# Patient Record
Sex: FEMALE | Race: ASIAN | URBAN - METROPOLITAN AREA
[De-identification: names, ages, dates, MRNs, and addresses within clinical notes are randomized per-mention and may not be internally consistent; named-entity substitution may affect disease eponyms.]

---

## 2023-01-03 ENCOUNTER — APPOINTMENT (RX ONLY)
Dept: URBAN - METROPOLITAN AREA CLINIC 55 | Facility: CLINIC | Age: 28
Setting detail: DERMATOLOGY
End: 2023-01-03

## 2023-01-03 DIAGNOSIS — Z41.9 ENCOUNTER FOR PROCEDURE FOR PURPOSES OTHER THAN REMEDYING HEALTH STATE, UNSPECIFIED: ICD-10-CM

## 2023-01-03 PROCEDURE — ? IN-HOUSE DISPENSING PHARMACY

## 2023-01-03 PROCEDURE — ? COSMETIC CONSULTATION: GENERAL

## 2023-01-03 PROCEDURE — ? INVENTORY

## 2023-01-03 NOTE — PROCEDURE: IN-HOUSE DISPENSING PHARMACY
Product 53 Refills: 0
Product 4 Application Directions: Apply nightly or as directed.
Product 7 Unit Type: ml
Product 4 Refills: 2
Product 20 Unit Type: mg
Product 1 Amount/Unit (Numbers Only): 30
Name Of Product 7: Lidocaine 23% / Tetracaine 7% Cream
Product 4 Units Dispensed: 1
Product 5 Application Directions: Apply nightly or as directed. Use SPF daily.
Product 7 Application Directions: Apply only as directed
Name Of Product 6: Rosacea Triple Combination Gel
Product 2 Refills: 11
Name Of Product 2: Dapsone / Spironolactone Gel
Name Of Product 4: Hydroquinone 8% Emulsion
Render Product Pricing In Note: Yes
Name Of Product 3: Acne Triple Combination Gel
Send Charges To Patient Encounter: No
Detail Level: Zone
Name Of Product 5: Hydrating Tretinoin 0.025% Cream
Name Of Product 1: Anti-Aging Brightening Cream

## 2023-01-12 ENCOUNTER — APPOINTMENT (RX ONLY)
Dept: URBAN - METROPOLITAN AREA CLINIC 55 | Facility: CLINIC | Age: 28
Setting detail: DERMATOLOGY
End: 2023-01-12

## 2023-01-12 DIAGNOSIS — Z41.9 ENCOUNTER FOR PROCEDURE FOR PURPOSES OTHER THAN REMEDYING HEALTH STATE, UNSPECIFIED: ICD-10-CM

## 2023-01-12 PROCEDURE — ? PALOMAR VECTUS LASER HAIR REMOVAL

## 2023-01-12 PROCEDURE — ? INVENTORY

## 2023-01-12 NOTE — PROCEDURE: PALOMAR VECTUS LASER HAIR REMOVAL
Laser Type: diode 810nm
Post-Procedure Care: Immediate endpoint: perifollicular erythema and edema. Post care was reviewed with the patient and all patient questions were answered to their satisfaction.
Consent obtained, risks reviewed.
Rate (Hz): Medium
Fluence In J/Cm2: 12/17
Render Post-Care In The Note: No
Treatment Number: 0
Optic Size: 12 x 12mm
Pre-Procedure: Prior to proceeding the treatment areas were cleaned and all present put on their eye protection.
Detail Level: Detailed
Post-Care Instructions: I reviewed with the patient in detail post-care instructions. Patient should avoid the sun before and after treatment.

## 2023-02-23 ENCOUNTER — APPOINTMENT (RX ONLY)
Dept: URBAN - METROPOLITAN AREA CLINIC 55 | Facility: CLINIC | Age: 28
Setting detail: DERMATOLOGY
End: 2023-02-23

## 2023-02-23 DIAGNOSIS — Z41.9 ENCOUNTER FOR PROCEDURE FOR PURPOSES OTHER THAN REMEDYING HEALTH STATE, UNSPECIFIED: ICD-10-CM

## 2023-02-23 PROCEDURE — ? PALOMAR VECTUS LASER HAIR REMOVAL

## 2023-02-23 PROCEDURE — ? INVENTORY

## 2023-02-23 NOTE — PROCEDURE: PALOMAR VECTUS LASER HAIR REMOVAL
Pre-Procedure: Prior to proceeding the treatment areas were cleaned and all present put on their eye protection.
Fluence In J/Cm2: 16/24
Treatment Number: 0
Rate (Hz): Medium
Render Post-Care In The Note: No
Post-Procedure Care: Immediate endpoint: perifollicular erythema and edema. Post care was reviewed with the patient and all patient questions were answered to their satisfaction.
Optic Size: 12 x 12mm
Consent obtained, risks reviewed.
Detail Level: Detailed
Post-Care Instructions: I reviewed with the patient in detail post-care instructions. Patient should avoid the sun before and after treatment.
Laser Type: diode 810nm

## 2023-03-30 ENCOUNTER — APPOINTMENT (RX ONLY)
Dept: URBAN - METROPOLITAN AREA CLINIC 55 | Facility: CLINIC | Age: 28
Setting detail: DERMATOLOGY
End: 2023-03-30

## 2023-03-30 DIAGNOSIS — Z41.9 ENCOUNTER FOR PROCEDURE FOR PURPOSES OTHER THAN REMEDYING HEALTH STATE, UNSPECIFIED: ICD-10-CM

## 2023-03-30 PROCEDURE — ? INVENTORY

## 2023-03-30 PROCEDURE — ? PALOMAR VECTUS LASER HAIR REMOVAL

## 2023-03-30 NOTE — PROCEDURE: PALOMAR VECTUS LASER HAIR REMOVAL
Optic Size: 12 x 12mm
Consent obtained, risks reviewed.
Rate (Hz): Medium
Detail Level: Detailed
Render Post-Care In The Note: No
Pre-Procedure: Prior to proceeding the treatment areas were cleaned and all present put on their eye protection.
Post-Care Instructions: I reviewed with the patient in detail post-care instructions. Patient should avoid the sun before and after treatment.
Laser Type: diode 810nm
External Cooling Fan Speed: 0
Fluence In J/Cm2: 14/22
Post-Procedure Care: Immediate endpoint: perifollicular erythema and edema. Post care was reviewed with the patient and all patient questions were answered to their satisfaction.

## 2023-05-10 ENCOUNTER — APPOINTMENT (RX ONLY)
Dept: URBAN - METROPOLITAN AREA CLINIC 55 | Facility: CLINIC | Age: 28
Setting detail: DERMATOLOGY
End: 2023-05-10

## 2023-05-10 DIAGNOSIS — Z41.9 ENCOUNTER FOR PROCEDURE FOR PURPOSES OTHER THAN REMEDYING HEALTH STATE, UNSPECIFIED: ICD-10-CM

## 2023-05-10 PROCEDURE — ? INVENTORY

## 2023-05-10 PROCEDURE — ? PALOMAR VECTUS LASER HAIR REMOVAL

## 2023-05-10 NOTE — PROCEDURE: PALOMAR VECTUS LASER HAIR REMOVAL
Consent obtained, risks reviewed.
Rate (Hz): Medium
Post-Procedure Care: Immediate endpoint: perifollicular erythema and edema. Post care was reviewed with the patient and all patient questions were answered to their satisfaction.
External Cooling Fan Speed: 0
Fluence In J/Cm2: 12/17
Treatment Number: 4
Post-Care Instructions: I reviewed with the patient in detail post-care instructions. Patient should avoid the sun before and after treatment.
Optic Size: 12 x 12mm
Detail Level: Detailed
Render Post-Care In The Note: No
Location Override: Lower face
Pre-Procedure: Prior to proceeding the treatment areas were cleaned and all present put on their eye protection.
Laser Type: diode 810nm

## 2023-05-13 ENCOUNTER — APPOINTMENT (RX ONLY)
Dept: URBAN - METROPOLITAN AREA CLINIC 55 | Facility: CLINIC | Age: 28
Setting detail: DERMATOLOGY
End: 2023-05-13

## 2023-05-13 DIAGNOSIS — Z41.9 ENCOUNTER FOR PROCEDURE FOR PURPOSES OTHER THAN REMEDYING HEALTH STATE, UNSPECIFIED: ICD-10-CM

## 2023-05-13 PROCEDURE — ? PALOMAR VECTUS LASER HAIR REMOVAL

## 2023-05-13 PROCEDURE — ? INVENTORY

## 2023-05-13 NOTE — PROCEDURE: PALOMAR VECTUS LASER HAIR REMOVAL
Post-Care Instructions: I reviewed with the patient in detail post-care instructions. Patient should avoid the sun before and after treatment.
Laser Type: diode 810nm
Detail Level: Detailed
External Cooling Fan Speed: 0
Post-Procedure Care: Immediate endpoint: perifollicular erythema and edema. Post care was reviewed with the patient and all patient questions were answered to their satisfaction.
Fluence In J/Cm2: 12/17
Rate (Hz): Medium
Treatment Number: 4
Render Post-Care In The Note: No
Consent obtained, risks reviewed.
Optic Size: 12 x 12mm
Location Override: Beard
Pre-Procedure: Prior to proceeding the treatment areas were cleaned and all present put on their eye protection.

## 2023-06-24 ENCOUNTER — APPOINTMENT (RX ONLY)
Dept: URBAN - METROPOLITAN AREA CLINIC 55 | Facility: CLINIC | Age: 28
Setting detail: DERMATOLOGY
End: 2023-06-24

## 2023-06-24 DIAGNOSIS — Z41.9 ENCOUNTER FOR PROCEDURE FOR PURPOSES OTHER THAN REMEDYING HEALTH STATE, UNSPECIFIED: ICD-10-CM

## 2023-06-24 PROCEDURE — ? PALOMAR VECTUS LASER HAIR REMOVAL

## 2023-06-24 PROCEDURE — ? INVENTORY

## 2023-06-24 NOTE — PROCEDURE: PALOMAR VECTUS LASER HAIR REMOVAL
Render Post-Care In The Note: No
Pre-Procedure: Prior to proceeding the treatment areas were cleaned and all present put on their eye protection.
External Cooling Fan Speed: 0
Laser Type: diode 810nm
Consent obtained, risks reviewed.
Fluence In J/Cm2: 12/17
Post-Procedure Care: Immediate endpoint: perifollicular erythema and edema. Post care was reviewed with the patient and all patient questions were answered to their satisfaction.
Rate (Hz): Medium
Detail Level: Detailed
Post-Care Instructions: I reviewed with the patient in detail post-care instructions. Patient should avoid the sun before and after treatment.
Optic Size: 12 x 12mm